# Patient Record
(demographics unavailable — no encounter records)

---

## 2021-03-10 NOTE — ED
Chest Pain HPI





- General


Source: patient, family, RN notes reviewed


Mode of arrival: wheelchair


Limitations: no limitations





- History of Present Illness


MD Complaint: chest pain


-: days(s) (5)


Onset: awoke with symptoms


Pain Location: substernal


Pain Radiation: none


Quality: tightness ("pressure")


Consistency: intermittent


Improves With: antacids (6 tums with short relief of symptoms)


Worsens With: supine





<Filipe Griggs - Last Filed: 03/10/21 13:21>





<Alina Frost - Last Filed: 03/16/21 23:29>





- General


Chief Complaint: Chest Pain


Stated Complaint: chest pain


Time Seen by Provider: 03/10/21 11:16





- History of Present Illness


Initial Comments: 





31-year-old white male patient presents to the emergency room with complaints of

substernal chest pain that started at 10 AM which woke him from his sleep.  

Patient states he took 6 tums and had some relief but then it came back.  

Patient states he was at [facility] on [address] 5 days ago for the 

same was diagnosed with reflux, told he had an ulcer and given a prescription 

for Pepcid and Carafate which he has not filled.  Patient describes pain as 

pressure and tight.  Denies cough, fever or nausea and vomiting.  Patient states

has significant family history with sister having cardiac arrest 2 as a baby., 

Maternal uncle having cardiac issues, and grandfather having bypass in his 20s 

or 30s.  Patient states was seen by cardiologist a year ago had echo done found 

to be within normal limits.  Patient is scheduled to have an upper endoscopy 

sometime in May.  Patient does admit to having anxiety about his family history 

of cardiac problems (Filipe Griggs)





- Related Data


                                Home Medications











 Medication  Instructions  Recorded  Confirmed


 


No Known Home Medications  03/10/21 03/10/21











                                    Allergies











Allergy/AdvReac Type Severity Reaction Status Date / Time


 


Penicillins Allergy  Anaphylaxis Verified 03/10/21 12:45














Review of Systems


ROS Other: All systems not noted in ROS Statement are negative.





<Filipe Griggs - Last Filed: 03/10/21 13:21>


ROS Other: All systems not noted in ROS Statement are negative.





<Alina Frost - Last Filed: 03/16/21 23:29>


ROS Statement: 


Those systems with pertinent positive or pertinent negative responses have been 

documented in the HPI.








Past Medical History


Past Medical History: No Reported History


Past Surgical History: No Surgical Hx Reported


Smoking Status: Never smoker


Past Alcohol Use History: None Reported


Past Drug Use History: None Reported





<Filipe Griggs - Last Filed: 03/10/21 13:21>





General Exam


Limitations: no limitations


General appearance: alert, in no apparent distress


Head exam: Present: atraumatic, normocephalic, normal inspection


Eye exam: Present: normal appearance, PERRL, EOMI.  Absent: scleral icterus, 

conjunctival injection, periorbital swelling


ENT exam: Present: normal exam, mucous membranes moist


Neck exam: Present: normal inspection.  Absent: tenderness, meningismus, 

lymphadenopathy


Respiratory exam: Present: normal lung sounds bilaterally.  Absent: respiratory 

distress, wheezes, rales, rhonchi, stridor


Cardiovascular Exam: Present: regular rate, normal rhythm, normal heart sounds. 

Absent: systolic murmur, diastolic murmur, rubs, gallop, clicks, JVD


GI/Abdominal exam: Present: soft, normal bowel sounds.  Absent: distended, ten

derness, guarding, rebound, rigid


Back exam: Present: normal inspection


Psychiatric exam: Present: normal affect, normal mood


Skin exam: Present: warm, dry, intact, normal color.  Absent: rash





<Filipe Griggs - Last Filed: 03/10/21 13:21>





Course





<Filipe Griggs - Last Filed: 03/10/21 13:21>





                                   Vital Signs











  03/10/21 03/10/21 03/10/21





  10:55 11:38 12:53


 


Temperature 97.9 F  


 


Pulse Rate 72  60


 


Pulse Rate [  60 





Cardiac Monitor   





]   


 


Respiratory 18 16 18





Rate   


 


Blood Pressure 131/85  


 


O2 Sat by Pulse 100  





Oximetry   














  03/10/21





  13:44


 


Temperature 


 


Pulse Rate 60


 


Pulse Rate [ 





Cardiac Monitor 





] 


 


Respiratory 19





Rate 


 


Blood Pressure 129/68


 


O2 Sat by Pulse 100





Oximetry 














- Reevaluation(s)


Reevaluation #1: 





03/10/21 12:22


Patient without relief from Pepcid, will try GI cocktail and reassess. 

(Filipe Griggs)


Reevaluation #2: 





03/10/21 13:21


Patient states relief with GI cocktail, pain is gone.   (Filipe Griggs)





Chest Pain MDM





- Differential Diagnosis


GERD





<Filipe Griggs - Last Filed: 03/10/21 13:21>





<Alina Frost - Last Filed: 03/16/21 23:29>





- MDM





Patient seen at [facility] and had workup for chest pain, was diagnosed

with GERD and prescribed Carafate and Pepcid with direction to have endoscopy to

check for ulcers.  Patient had an echo one year ago with cardiologist in [place]

and was within normal limits.  Chest x-ray negative and patient had relief with 

GI cocktail here in the emergency room states pain is 0.  Labs are within normal

limits with troponin of 0.012.  Patient agreeable to getting previously 

prescribed Carafate and Pepcid  and scheduling his endoscopy, and following up 

with his primary care doctor (Filipe Griggs)


I was available for consultation in the emergency department.  The history and 

physical exam were done by the midlevel provider. I was consulted for this 

patients care. I reviewed the case with the midlevel provider and based on 

their presentation of the patient, I agree with the assessment, medical decision

making and plan of care as documented.





Chart was dictated using Dragon dictation software.  Attempts were made to 

correct any dictation errors however some typographical errors may persist. (Alina Blackburn)





Disposition


Is patient prescribed a controlled substance at d/c from ED?: No


Time of Disposition: 13:30





<Filipe Griggs - Last Filed: 03/10/21 13:21>





<Alina Frost - Last Filed: 03/16/21 23:29>


Clinical Impression: 


 Gastroesophageal reflux disease





Disposition: HOME SELF-CARE


Condition: Good


Instructions (If sedation given, give patient instructions):  Chest Pain (ED), 

Gastroesophageal Reflux Disease (ED)


Referrals: 


None,Stated [Primary Care Provider] - 1-2 days

## 2021-03-10 NOTE — XR
EXAMINATION TYPE: XR chest 2V

 

DATE OF EXAM: 3/10/2021

 

COMPARISON: NONE

 

HISTORY: Chest pain.

 

TECHNIQUE:  Frontal and lateral views of the chest are obtained.

 

FINDINGS:  There is no focal air space opacity, pleural effusion, or pneumothorax seen.  The cardiac 
silhouette size is within normal limits.   The osseous structures are intact. Overlying EKG leads.

 

IMPRESSION:  No acute cardiopulmonary process.